# Patient Record
(demographics unavailable — no encounter records)

---

## 2019-09-03 NOTE — REPVR
EXAM: 

CT Abdomen and Pelvis With Contrast 



EXAM DATE/TIME: 

9/3/2019 9:19 PM 



CLINICAL HISTORY: 

31 years old, male; Abdominal pain; Localized; Left lower quadrant (llq); 

Additional info: Llq pain, diarrhea 



TECHNIQUE: 

Imaging protocol: Computed tomography of the abdomen and pelvis with 

intravenous contrast. 

Radiation optimization: All CT scans at this facility use at least one of these 

dose optimization techniques: automated exposure control; mA and/or kV 

adjustment per patient size (includes targeted exams where dose is matched to 

clinical indication); or iterative reconstruction. 

Contrast material: ISOVUE 370; Contrast volume: 100 ml; Contrast route: IV;  



COMPARISON: 

No relevant prior studies available. 



FINDINGS: 



Liver: Unremarkable. No mass. 

Gallbladder and bile ducts: There is a calcified stone within the gallbladder. 

No gallbladder wall thickening or pericholecystic fluid is identified. 

Pancreas: Unremarkable. No ductal dilation. 

Spleen: Unremarkable. No splenomegaly. 

Adrenals: Normal. No mass. 

Kidneys and ureters: Unremarkable. No stones. No hydronephrosis. 

Stomach and bowel: Unremarkable. No obstruction. No mucosal thickening. 

Appendix: No evidence of appendicitis. 

Intraperitoneal space: Unremarkable. No free air. No significant fluid 

collection. 

Vasculature: Unremarkable. No abdominal aortic aneurysm. 

Lymph nodes: Unremarkable. No enlarged lymph nodes. 



Bladder: Unremarkable as visualized. 

Reproductive: Unremarkable as visualized. 

Bones/joints: No acute fracture. 

Soft tissues: Unremarkable. 



IMPRESSION: 

1. No acute abnormality. 

2. Cholelithiasis. 



Electronically signed by: Darnell Mccormick On 09/03/2019  21:49:25 PM